# Patient Record
Sex: FEMALE | Race: WHITE | NOT HISPANIC OR LATINO | URBAN - METROPOLITAN AREA
[De-identification: names, ages, dates, MRNs, and addresses within clinical notes are randomized per-mention and may not be internally consistent; named-entity substitution may affect disease eponyms.]

---

## 2019-02-04 ENCOUNTER — TELEPHONE (OUTPATIENT)
Dept: SCHEDULING | Facility: IMAGING CENTER | Age: 59
End: 2019-02-04

## 2019-10-03 ENCOUNTER — OFFICE VISIT (OUTPATIENT)
Dept: NEUROLOGY | Facility: MEDICAL CENTER | Age: 59
End: 2019-10-03
Payer: COMMERCIAL

## 2019-10-03 VITALS
OXYGEN SATURATION: 95 % | HEART RATE: 74 BPM | DIASTOLIC BLOOD PRESSURE: 64 MMHG | WEIGHT: 227 LBS | TEMPERATURE: 98.2 F | SYSTOLIC BLOOD PRESSURE: 102 MMHG | BODY MASS INDEX: 38.76 KG/M2 | HEIGHT: 64 IN

## 2019-10-03 DIAGNOSIS — G43.119 INTRACTABLE MIGRAINE WITH AURA WITHOUT STATUS MIGRAINOSUS: Primary | ICD-10-CM

## 2019-10-03 PROBLEM — M54.17 LUMBOSACRAL RADICULOPATHY AT L5: Status: ACTIVE | Noted: 2019-10-03

## 2019-10-03 PROBLEM — F31.31 BIPOLAR 1 DISORDER, DEPRESSED, MILD (HCC): Status: ACTIVE | Noted: 2019-10-03

## 2019-10-03 PROCEDURE — 99205 OFFICE O/P NEW HI 60 MIN: CPT | Performed by: PSYCHIATRY & NEUROLOGY

## 2019-10-03 RX ORDER — SUMATRIPTAN 6 MG/.5ML
6 INJECTION, SOLUTION SUBCUTANEOUS ONCE
Qty: 0.5 ML | Refills: 0 | Status: SHIPPED | OUTPATIENT
Start: 2019-10-03 | End: 2019-10-03

## 2019-10-03 RX ORDER — FLUOXETINE HYDROCHLORIDE 20 MG/1
20 CAPSULE ORAL DAILY
COMMUNITY

## 2019-10-03 RX ORDER — PROPRANOLOL HYDROCHLORIDE 40 MG/1
20 TABLET ORAL 3 TIMES DAILY
COMMUNITY

## 2019-10-03 RX ORDER — LAMOTRIGINE 150 MG/1
150 TABLET ORAL DAILY
COMMUNITY

## 2019-10-03 RX ORDER — MAGNESIUM 200 MG
TABLET ORAL
COMMUNITY

## 2019-10-03 RX ORDER — PROPRANOLOL HCL 60 MG
CAPSULE, EXTENDED RELEASE 24HR ORAL
Qty: 60 CAP | Refills: 3 | Status: SHIPPED | OUTPATIENT
Start: 2019-10-03

## 2019-10-03 RX ORDER — MULTIVIT-MIN/IRON/FOLIC ACID/K 18-600-40
CAPSULE ORAL
COMMUNITY

## 2019-10-03 RX ORDER — GABAPENTIN 300 MG/1
300 CAPSULE ORAL 3 TIMES DAILY
COMMUNITY

## 2019-10-03 RX ORDER — MONTELUKAST SODIUM 10 MG/1
10 TABLET ORAL DAILY
COMMUNITY

## 2019-10-03 RX ORDER — SUMATRIPTAN 6 MG/.5ML
6 INJECTION, SOLUTION SUBCUTANEOUS ONCE
COMMUNITY
End: 2019-10-03 | Stop reason: SDUPTHER

## 2019-10-03 ASSESSMENT — ENCOUNTER SYMPTOMS
INSOMNIA: 0
LOSS OF CONSCIOUSNESS: 0
CONSTIPATION: 0
MEMORY LOSS: 0
HEADACHES: 1
FALLS: 0

## 2019-10-03 ASSESSMENT — PATIENT HEALTH QUESTIONNAIRE - PHQ9: CLINICAL INTERPRETATION OF PHQ2 SCORE: 0

## 2019-10-04 NOTE — PROGRESS NOTES
"Subjective:      Jacque Reid is a 58 y.o. female who presents from the office of Dr. Huma Card, for consultation, with a history of migraine with aura.    KYMBERLY Santillan is a very pleasant 58-year-old right-handed female who has been suffering from severe headache since about 18 years of age, I am which have overtime changed to a degree though still have a very common phenomenology.    Prodrome: She describes about 24 hours of malaise, yawning and a generalized \"cruddy\" feeling.  There is some right facial tingling that begins.    Aura: She does develop language difficulties during the headache phase itself.    Headache: Typically unilateral over the right, she describes a retro-orbital stabbing and excruciating pain through the eye.  The numbness on the right side of the face worsens, as does the language difficulty, nausea and vomiting, heightened sensitivities, etc. ensue.  She has significant allodynia ipsilaterally as well as autonomic symptoms.    Duration: For quite some time she has had a very mild daily headache.  The severe headaches will last upwards of 3 days, on rescue medication, they now last about 1 day.    Frequency: On her present regimen she has a severe headache once a week.  Off medication she was having at least 2 full headache attacks on a near weekly basis.    Onset: Random over the course of the day, now on CPAP for DANNA, the a.m. onset headaches have stopped.    Triggers: She has a long list with her, the big points are sleep deprivation, stress levels, weather changes, bright lights, food and alcohol.  Prior to her JUVE/BSO at 32 years of age, they were menstrual in association.  Estrogen replacement therapy worsen the headaches.    Work-up: CT and MRI imaging of the brain have always been normal.    Treatment: She is using ibuprofen 800 mg every day for the daily headaches.  Imitrex 6 mg injections consistently improves the pain though the aphasia and other non-pain symptoms remain.  " "Inderal 40 mg daily also has had a significant benefit with the frequency.  On Neurontin 300 mg, 3 times daily for nerve pain, this has provided no benefit.  Depakote, Aleve were ineffective, Topamax caused hallucinations, she avoids TCAs because of her bipolar disease.    She has a medical history of right lower extremity L5 radiculopathy status post decompression in .  She also is bipolar and has IBD.  There is no history of CVA, CAD, PVD, malignancy, thyroid disease, other autoimmune disease, liver or kidney disease, blood dyscrasia, hypertension, diabetes or asthma.  There is no other surgical history of note.    Both her sister and a maternal uncle suffer from migraine.  Her father had heart disease and bipolar disease, her mother had Parkinson's disease, both are .  Though her brother has heart disease and bipolar disease, he does not suffer from headache.    She does not smoke or drink.  She works as an MA.    She is on Inderal 40 mg daily, Imitrex 6 mg injections as needed, Singulair, magnesium 200 mg daily, Lamictal 150 mg daily, Neurontin 300 mg 3 times daily, Prozac 20 mg daily, Advair Diskus and her albuterol inhaler as needed.    Review of Systems   Constitutional: Negative for malaise/fatigue.   Gastrointestinal: Negative for constipation.   Musculoskeletal: Negative for falls.   Neurological: Positive for headaches. Negative for loss of consciousness.   Psychiatric/Behavioral: Negative for memory loss. The patient does not have insomnia.    All other systems reviewed and are negative.       Objective:     /64 (BP Location: Left arm, Patient Position: Sitting, BP Cuff Size: Adult)   Pulse 74   Temp 36.8 °C (98.2 °F) (Temporal)   Ht 1.626 m (5' 4\")   Wt 103 kg (227 lb)   SpO2 95%   BMI 38.96 kg/m²      Physical Exam    She appears in no acute distress.  Vital signs are stable.  There is no malar rash, jaw or temporal tenderness, jaw claudication, or allodynia.  The neck is " supple, range of motion is full, carotid pulses are present bilaterally without asymmetry.  Compression maneuvers are negative.  Lhermitte's phenomena is absent.  Cardiac evaluation reveals a regular rhythm.    Cognition is intact, fully oriented, there is no aphasia, agnosia, apraxia, or inattention.    PERRLA/EOMI, visual fields are full on confrontation to finger counting, facial movements are symmetric, sensory exam is intact to temperature and light touch bilaterally, the tongue and uvula are midline without bulbar dysfunction, shoulder shrug and head rotation are intact and symmetric.    Musculoskeletal exam reveals normal tone throughout, there is no tremor, asterixis, or drift.  Strength is 5/5 bilaterally, reflexes are brisk and present at all points, none are dropped and there are no asymmetries, both toes are downgoing.    She stands easily, arm swing is symmetric, gait is of normal station.  Heel, toe, and tandem walking are all done easily.  There is no appendicular dystaxia with any of the extremities, fine motor control and repetitive movements are also intact and symmetric with normal amplitude and frequencies bilaterally.    Sensory exam is intact to vibration and temperature, Romberg is absent.     Assessment/Plan:     1. Intractable migraine with aura without status migrainosus  She fits criteria for migraine with aura, I talked with her about the 24 hours of a prodrome symptomatology, often mistaken for aura.  Regardless, she is already on medication that should be adjusted upwards, she is tolerating the Inderal at a low dose.  Talked about side effects.  She is using the Imitrex appropriately.  What she must do is stop the regular use of Motrin, she already has CDH, and with a pattern that suggests rebound phenomena occurring.  Off the Motrin, the headaches in general will simply improve naturally.    We have quite a few treatment options left to us.  She is already on magnesium, a list of  supplements was provided, samples of Pentadolex 75 mg, twice daily were provided, butter-luis alfredo having been established as quite effective for migraine prevention.  Other maintenance treatments including Migralief, coenzyme Q10, niacin, and melatonin were mentioned, information provided, she may be interested in trying these.    Face-to-face time spent talking about the nature of her condition, prognosis, other treatment options still available, etc.  Phone contact in the interim, we will follow-up otherwise in about 6 months.    Inderal LA 60 mg will be started for 2 weeks and if needed can be increased to 120 mg daily.  Side effects were reviewed.  Phone contact as we adjust.    - propranolol LA (INDERAL LA) 60 MG CAPSULE SR 24 HR; 1 tab qAM for 1 week, then 2 tab qAM  Dispense: 60 Cap; Refill: 3  - SUMAtriptan Succinate (IMITREX) 6 MG/0.5ML Solution; Inject 0.5 mL as instructed Once for 1 dose.  Dispense: 0.5 mL; Refill: 0    Time: 60-minute spent face-to-face for exam, review, discussion, and education, of this over 50% of the time spent counseling and coordinating care.

## 2019-10-23 ENCOUNTER — TELEPHONE (OUTPATIENT)
Dept: NEUROLOGY | Facility: MEDICAL CENTER | Age: 59
End: 2019-10-23

## 2019-10-23 NOTE — TELEPHONE ENCOUNTER
Dr. Brennan,    Patient called and stated that the Rx's you had put her on is working and she has had relief from Migraines and headaches  (Pentadolex) if I am reading right. She stated that she will call back in a week to tell us again how she is feeling.

## 2019-10-23 NOTE — TELEPHONE ENCOUNTER
If you look at my note, the medications I also used for Inderal LA 60 mg tablets and Imitrex 6 mg injections as needed.  When the patient calls back, find out not just if she is using Pentadolex, but also the others.  Find out what strength of Pentadolex she is using.

## 2019-11-10 ENCOUNTER — OFFICE VISIT (OUTPATIENT)
Dept: URGENT CARE | Facility: PHYSICIAN GROUP | Age: 59
End: 2019-11-10
Payer: COMMERCIAL

## 2019-11-10 VITALS
HEIGHT: 64 IN | SYSTOLIC BLOOD PRESSURE: 108 MMHG | WEIGHT: 210 LBS | HEART RATE: 64 BPM | RESPIRATION RATE: 24 BRPM | OXYGEN SATURATION: 95 % | TEMPERATURE: 97.2 F | BODY MASS INDEX: 35.85 KG/M2 | DIASTOLIC BLOOD PRESSURE: 62 MMHG

## 2019-11-10 DIAGNOSIS — J45.901 ASTHMA WITH ACUTE EXACERBATION, UNSPECIFIED ASTHMA SEVERITY, UNSPECIFIED WHETHER PERSISTENT: ICD-10-CM

## 2019-11-10 PROCEDURE — 99204 OFFICE O/P NEW MOD 45 MIN: CPT | Mod: 25 | Performed by: NURSE PRACTITIONER

## 2019-11-10 PROCEDURE — 94640 AIRWAY INHALATION TREATMENT: CPT | Performed by: NURSE PRACTITIONER

## 2019-11-10 RX ORDER — IPRATROPIUM BROMIDE AND ALBUTEROL SULFATE 2.5; .5 MG/3ML; MG/3ML
3 SOLUTION RESPIRATORY (INHALATION) 4 TIMES DAILY
Qty: 30 BULLET | Refills: 0 | Status: SHIPPED | OUTPATIENT
Start: 2019-11-10

## 2019-11-10 RX ORDER — FLUOXETINE HYDROCHLORIDE 40 MG/1
CAPSULE ORAL
COMMUNITY
Start: 2019-08-27

## 2019-11-10 RX ORDER — FLUOXETINE HYDROCHLORIDE 20 MG/1
CAPSULE ORAL
COMMUNITY
Start: 2018-03-22 | End: 2020-03-21

## 2019-11-10 RX ORDER — INFLUENZA A VIRUS A/BRISBANE/02/2018 (H1N1) RECOMBINANT HEMAGGLUTININ ANTIGEN, INFLUENZA A VIRUS A/KANSAS/14/2017 (H3N2) RECOMBINANT HEMAGGLUTININ ANTIGEN, INFLUENZA B VIRUS B/PHUKET/3073/2013 RECOMBINANT HEMAGGLUTININ ANTIGEN, AND INFLUENZA B VIRUS B/MARYLAND/15/2016 RECOMBINANT HEMAGGLUTININ ANTIGEN 45; 45; 45; 45 UG/.5ML; UG/.5ML; UG/.5ML; UG/.5ML
INJECTION INTRAMUSCULAR
COMMUNITY
Start: 2019-09-27

## 2019-11-10 RX ORDER — FLUOXETINE 10 MG/1
CAPSULE ORAL
COMMUNITY
Start: 2018-03-23 | End: 2020-03-22

## 2019-11-10 RX ORDER — PROPRANOLOL HYDROCHLORIDE 20 MG/1
TABLET ORAL
COMMUNITY
Start: 2018-08-08 | End: 2020-08-07

## 2019-11-10 RX ORDER — IPRATROPIUM BROMIDE AND ALBUTEROL SULFATE 2.5; .5 MG/3ML; MG/3ML
3 SOLUTION RESPIRATORY (INHALATION) ONCE
OUTPATIENT
Start: 2019-11-10 | End: 2019-11-11

## 2019-11-10 RX ORDER — ALBUTEROL SULFATE 90 UG/1
AEROSOL, METERED RESPIRATORY (INHALATION)
COMMUNITY
Start: 2018-03-13 | End: 2020-03-12

## 2019-11-10 RX ORDER — INHALER, ASSIST DEVICES
SPACER (EA) MISCELLANEOUS
COMMUNITY
Start: 2017-11-21 | End: 2019-11-21

## 2019-11-10 RX ORDER — GABAPENTIN 100 MG/1
CAPSULE ORAL
COMMUNITY
Start: 2018-04-25 | End: 2020-04-24

## 2019-11-10 RX ORDER — LAMOTRIGINE 150 MG/1
TABLET ORAL
COMMUNITY
Start: 2018-03-22 | End: 2020-03-21

## 2019-11-10 RX ORDER — FLUOXETINE 10 MG/1
CAPSULE ORAL
COMMUNITY
Start: 2019-11-06

## 2019-11-10 RX ORDER — PREDNISONE 10 MG/1
40 TABLET ORAL DAILY
Qty: 20 TAB | Refills: 0 | Status: SHIPPED | OUTPATIENT
Start: 2019-11-10 | End: 2019-11-15

## 2019-11-10 RX ORDER — SUMATRIPTAN 100 MG/1
TABLET, FILM COATED ORAL
COMMUNITY
Start: 2019-08-27

## 2019-11-10 RX ORDER — ALBUTEROL SULFATE 2.5 MG/3ML
SOLUTION RESPIRATORY (INHALATION)
COMMUNITY
Start: 2018-09-27 | End: 2020-09-26

## 2019-11-10 RX ORDER — MONTELUKAST SODIUM 10 MG/1
TABLET ORAL
COMMUNITY
Start: 2018-03-14 | End: 2020-03-13

## 2019-11-10 RX ORDER — ZOSTER VACCINE RECOMBINANT, ADJUVANTED 50 MCG/0.5
KIT INTRAMUSCULAR
COMMUNITY
Start: 2019-09-27

## 2019-11-10 RX ORDER — SUMATRIPTAN SUCCINATE 6 MG/.5ML
INJECTION, SOLUTION SUBCUTANEOUS
COMMUNITY
Start: 2017-07-31 | End: 2020-03-14

## 2019-11-10 RX ORDER — TRIAMCINOLONE ACETONIDE 1 MG/G
CREAM TOPICAL
COMMUNITY
Start: 2018-04-04 | End: 2020-04-03

## 2019-11-10 ASSESSMENT — ENCOUNTER SYMPTOMS
WHEEZING: 1
HEADACHES: 0
VOMITING: 0
SORE THROAT: 0
NAUSEA: 0
SHORTNESS OF BREATH: 1
FEVER: 0
COUGH: 0
BACK PAIN: 1
STRIDOR: 0

## 2019-11-10 NOTE — NON-PROVIDER
Chief Complaint   Patient presents with   • Shortness of Breath     x2 days. Difficulty breathing.     HPI: Patient presents to clinic complaining of SOB today.  States that she felt a head cold come on yesterday and then felt SOB with wheezing today.  Has tried an albuterol nebulizer at home with minimal results, but admits to medication being .  Is lying on back on exam table, as she states it is easier to breath when she is in this position.  Has history of asthma and currently takes albuterol inhaler and Advair daily for asthma. Denies any OTC medications for cold symptoms.  Associated with nasal congestion, nasal drainage and pain in her upper back. Denies fever, headache, sore throat, and cough.  States that these symptoms frequently happen to her during this time of year and she usually gets a breathing treatment and steroids with good results.      Review of Systems   Constitutional: Negative for fever.   HENT: Positive for congestion. Negative for sore throat.    Respiratory: Positive for shortness of breath and wheezing. Negative for cough and stridor.    Cardiovascular: Negative for chest pain.   Gastrointestinal: Negative for nausea and vomiting.   Musculoskeletal: Positive for back pain (tightness in upper back ).   Neurological: Negative for headaches.     Physical Exam   Constitutional: She is oriented to person, place, and time and well-developed, well-nourished, and in no distress.   HENT:   Head: Normocephalic and atraumatic.   Right Ear: Hearing, tympanic membrane and ear canal normal.   Left Ear: Hearing, tympanic membrane and ear canal normal.   Nose: Rhinorrhea present.   Mouth/Throat: Uvula is midline and mucous membranes are normal. Posterior oropharyngeal erythema present.   Cardiovascular: Normal rate, regular rhythm and normal heart sounds.   Pulmonary/Chest: Breath sounds normal. Tachypnea (Increased work of breathing while talking, able to say 3-4 words with each breath) noted.  She has no wheezes. She has no rhonchi.   Neurological: She is alert and oriented to person, place, and time.     1. Asthma with acute exacerbation, unspecified asthma severity, unspecified whether persistent  Patient given breathing treatment in office. After treatment, patient reports that symptoms have greatly improved.  Lungs clear and pulse ox 96% after treatment.  Requested Rx for duoneb, as she has had this Rx previously and it worked well.  Discussed home care for upper respiratory tract infection.  ER precautions given to patient.   - ipratropium-albuterol (DUONEB) nebulizer solution  - predniSONE (DELTASONE) 10 MG Tab; Take 4 Tabs by mouth every day for 5 days. Take with food.  Dispense: 20 Tab; Refill: 0  - ipratropium-albuterol (DUONEB) 0.5-2.5 (3) MG/3ML nebulizer solution; 3 mL by Nebulization route 4 times a day.  Dispense: 30 Bullet; Refill: 0    ZACH Srinivasan

## 2019-11-11 NOTE — PROGRESS NOTES
Chief Complaint   Patient presents with   • Shortness of Breath     x2 days. Difficulty breathing.       HISTORY OF PRESENT ILLNESS: Patient is a 59 y.o. female who presents to urgent care today with complaints of asthma exacerbation.  The patient notes the past 24 hours she has had a cough, wheezing, and shortness of breath.  Notes her symptoms started yesterday with URI symptoms and have progressed throughout today.  She admits to a history of asthma and states her symptoms today are exactly similar to previous asthma exacerbations.  Also admits to associated congestion and chest tightness, this is common for her with her asthma exacerbations.  She has taken her home albuterol nebulizer without much improvement.  She also takes albuterol inhaler as well as Advair.  Typically, a DuoNeb along with steroids is beneficial in treatment.    Patient Active Problem List    Diagnosis Date Noted   • Bipolar 1 disorder, depressed, mild (HCC) 10/03/2019   • Lumbosacral radiculopathy at L5 10/03/2019   • Intractable migraine with aura without status migrainosus 10/03/2019       Allergies:Amoxicillin; Erythromycin; Hydrocodone; Topiramate; and Vicodin [hydrocodone-acetaminophen]    Current Outpatient Medications Ordered in Epic   Medication Sig Dispense Refill   • albuterol (VENTOLIN HFA) 108 (90 Base) MCG/ACT Aero Soln inhalation aerosol Inhale 1 to 2 puffs orally every 4 to 6 hours as needed for quick relief of asthma symptoms. 100 days' supply for asthma is 1 canister. Shake well before use, keep at room temperature. Inhale slowly and deeply through inhalational spacer (no whistling sound) Clean/rinse mouthpiece weekly     • Peak Flow Meter (TRUZONE PEAK FLOW METER) Device by Does not apply route.     • Spacer/Aero-Holding Chambers (AEROCHAMBER PLUS-FLOW SIGNAL) Misc by Does not apply route.     • sumatriptan (IMITREX) 100 MG tablet      • predniSONE (DELTASONE) 10 MG Tab Take 4 Tabs by mouth every day for 5 days. Take with  food. 20 Tab 0   • ipratropium-albuterol (DUONEB) 0.5-2.5 (3) MG/3ML nebulizer solution 3 mL by Nebulization route 4 times a day. 30 Bullet 0   • lamotrigine (LAMICTAL) 150 MG tablet Take 150 mg by mouth every day.     • FLUoxetine (PROZAC) 20 MG Cap Take 20 mg by mouth every day. Indications: takes three times a day     • propranolol (INDERAL) 40 MG Tab Take 20 mg by mouth 3 times a day.     • montelukast (SINGULAIR) 10 MG Tab Take 10 mg by mouth every day.     • gabapentin (NEURONTIN) 300 MG Cap Take 300 mg by mouth 3 times a day.     • Magnesium 200 MG Tab Take  by mouth.     • fluticasone-salmeterol (ADVAIR DISKUS) 250-50 MCG/DOSE AEROSOL POWDER, BREATH ACTIVATED Inhale 1 Puff by mouth every 12 hours.     • albuterol (PROVENTIL) 2.5mg/3ml Nebu Soln solution for nebulization Inhale  by mouth.     • aspirin EC (ECOTRIN) 81 MG Tablet Delayed Response Take  by mouth.     • fluoxetine (PROZAC) 40 MG capsule      • FLUBLOK QUADRIVALENT 0.5 ML Solution Prefilled Syringe injection      • SUMAtriptan Succinate Refill 6 MG/0.5ML Solution Cartridge Use as directed for migraine. Special Order Item: Allow 5 Working Days.     • SHINGRIX 50 MCG/0.5ML Recon Susp      • triamcinolone acetonide (KENALOG) 0.1 % Cream Apply  to affected area(s).     • FLUoxetine (PROZAC) 10 MG Cap TAKE 1 CAPSULE ORALLY EVERY MORNING WITH 20 MG CAPSULE     • FLUoxetine (PROZAC) 10 MG Cap      • FLUoxetine (PROZAC) 20 MG Cap Take 1 capsule by mouth daily . Note: New Strength     • fluticasone-salmeterol (ADVAIR DISKUS) 250-50 MCG/DOSE AEROSOL POWDER, BREATH ACTIVATED Inhale  by mouth.     • gabapentin (NEURONTIN) 100 MG Cap Take  by mouth.     • lamotrigine (LAMICTAL) 150 MG tablet Take 1 tablet by mouth daily     • montelukast (SINGULAIR) 10 MG Tab Take  by mouth.     • propranolol (INDERAL) 20 MG Tab Take 1 tablet by mouth 2 times a day     • Cholecalciferol (VITAMIN D) 2000 units Cap Take  by mouth.     • ALBUTEROL INH Inhale  by mouth.     •  "propranolol LA (INDERAL LA) 60 MG CAPSULE SR 24 HR 1 tab qAM for 1 week, then 2 tab qAM (Patient not taking: Reported on 11/10/2019) 60 Cap 3     Current Facility-Administered Medications Ordered in Epic   Medication Dose Route Frequency Provider Last Rate Last Dose   • ipratropium-albuterol (DUONEB) nebulizer solution  3 mL Nebulization Once THONG IbarraP.R.NJaclyn           History reviewed. No pertinent past medical history.    Social History     Tobacco Use   • Smoking status: Never Smoker   • Smokeless tobacco: Never Used   Substance Use Topics   • Alcohol use: Not Currently   • Drug use: Never       No family status information on file.   History reviewed. No pertinent family history.    ROS:  Review of Systems   Constitutional: Negative for fever, chills, weight loss, malaise, and fatigue.   HENT: Positive for congestion.  Negative for ear pain, nosebleeds, sore throat and neck pain.    Eyes: Negative for vision changes.   Neuro: Negative for headache, sensory changes, weakness, seizure, LOC.   Cardiovascular: Negative for chest pain, palpitations, orthopnea and leg swelling.   Respiratory: Positive for cough, chest and back tightness, shortness of breath and wheezing.   Gastrointestinal: Negative for abdominal pain, nausea, vomiting or diarrhea.   Genitourinary: Negative for dysuria, urgency and frequency.  Musculoskeletal: Negative for falls, neck pain, back pain, joint pain, myalgias.   Skin: Negative for rash, diaphoresis.     Exam:  /62   Pulse 64   Temp 36.2 °C (97.2 °F)   Resp (!) 24   Ht 1.626 m (5' 4\")   Wt 95.3 kg (210 lb)   SpO2 95%   General: well-nourished, well-developed female in NAD  Head: normocephalic, atraumatic  Eyes: PERRLA, no conjunctival injection, acuity grossly intact, lids normal.  Ears: normal shape and symmetry, no tenderness, no discharge. External canals are without any significant edema or erythema. Tympanic membranes are without any inflammation, no effusion. Gross " auditory acuity is intact.  Nose: symmetrical without tenderness, no discharge.  Mouth/Throat: reasonable hygiene, no erythema, exudates or tonsillar enlargement.  Neck: no masses, range of motion within normal limits, no tracheal deviation. No obvious thyroid enlargement.   Lymph: no cervical adenopathy. No supraclavicular adenopathy.   Neuro: alert and oriented. Cranial nerves 1-12 grossly intact. No sensory deficit.   Cardiovascular: regular rate and rhythm. No edema.  Pulmonary: Speaking in slightly shorter sentences but is in no acute distress. Chest is symmetrical with respiration, no wheezes, crackles, or rhonchi.  Diminished throughout.  Musculoskeletal: no clubbing, appropriate muscle tone, gait is stable.  Skin: warm, dry, intact, no clubbing, no cyanosis, no rashes.   Psych: appropriate mood, affect, judgement.         Assessment/Plan:  1. Asthma with acute exacerbation, unspecified asthma severity, unspecified whether persistent  ipratropium-albuterol (DUONEB) nebulizer solution    predniSONE (DELTASONE) 10 MG Tab    ipratropium-albuterol (DUONEB) 0.5-2.5 (3) MG/3ML nebulizer solution       Patient is given a DuoNeb treatment in clinic with improvement of her symptoms.  She will be given DuoNeb nebulizer solution at home, use as directed.  She also be given a course of prednisone.  Continue home asthma medications as directed.  Supportive care, differential diagnoses, and indications for immediate follow-up discussed with patient.   Pathogenesis of diagnosis discussed including typical length and natural progression.   Instructed to return to clinic or nearest emergency department for any change in condition, further concerns, or worsening of symptoms.  Patient states understanding of the plan of care and discharge instructions.  Instructed to make an appointment, for follow up, with her primary care provider.  Patient was evaluated both by myself and APRN student today.      Please note that this  dictation was created using voice recognition software. I have made every reasonable attempt to correct obvious errors, but I expect that there are errors of grammar and possibly content that I did not discover before finalizing the note.      SLAVA Ibarra.

## 2021-03-15 DIAGNOSIS — Z23 NEED FOR VACCINATION: ICD-10-CM

## 2021-03-24 ENCOUNTER — IMMUNIZATION (OUTPATIENT)
Dept: FAMILY PLANNING/WOMEN'S HEALTH CLINIC | Facility: IMMUNIZATION CENTER | Age: 61
End: 2021-03-24
Attending: INTERNAL MEDICINE
Payer: COMMERCIAL

## 2021-03-24 DIAGNOSIS — Z23 NEED FOR VACCINATION: ICD-10-CM

## 2021-03-24 DIAGNOSIS — Z23 ENCOUNTER FOR VACCINATION: Primary | ICD-10-CM

## 2021-03-24 PROCEDURE — 0001A PFIZER SARS-COV-2 VACCINE: CPT

## 2021-03-24 PROCEDURE — 91300 PFIZER SARS-COV-2 VACCINE: CPT

## 2021-04-16 ENCOUNTER — IMMUNIZATION (OUTPATIENT)
Dept: FAMILY PLANNING/WOMEN'S HEALTH CLINIC | Facility: IMMUNIZATION CENTER | Age: 61
End: 2021-04-16
Attending: INTERNAL MEDICINE
Payer: COMMERCIAL

## 2021-04-16 DIAGNOSIS — Z23 ENCOUNTER FOR VACCINATION: Primary | ICD-10-CM

## 2021-04-16 PROCEDURE — 0002A PFIZER SARS-COV-2 VACCINE: CPT

## 2021-04-16 PROCEDURE — 91300 PFIZER SARS-COV-2 VACCINE: CPT

## 2023-08-22 ENCOUNTER — APPOINTMENT (OUTPATIENT)
Dept: URGENT CARE | Facility: PHYSICIAN GROUP | Age: 63
End: 2023-08-22
Payer: COMMERCIAL

## 2023-12-05 ENCOUNTER — APPOINTMENT (RX ONLY)
Dept: URBAN - METROPOLITAN AREA CLINIC 4 | Facility: CLINIC | Age: 63
Setting detail: DERMATOLOGY
End: 2023-12-05

## 2023-12-05 DIAGNOSIS — D485 NEOPLASM OF UNCERTAIN BEHAVIOR OF SKIN: ICD-10-CM | Status: INADEQUATELY CONTROLLED

## 2023-12-05 PROBLEM — D48.5 NEOPLASM OF UNCERTAIN BEHAVIOR OF SKIN: Status: ACTIVE | Noted: 2023-12-05

## 2023-12-05 PROCEDURE — ? BIOPSY BY SHAVE METHOD

## 2023-12-05 PROCEDURE — 11102 TANGNTL BX SKIN SINGLE LES: CPT

## 2023-12-05 ASSESSMENT — LOCATION DETAILED DESCRIPTION DERM: LOCATION DETAILED: LEFT SUPERIOR CENTRAL MALAR CHEEK

## 2023-12-05 ASSESSMENT — LOCATION ZONE DERM: LOCATION ZONE: FACE

## 2023-12-05 ASSESSMENT — LOCATION SIMPLE DESCRIPTION DERM: LOCATION SIMPLE: LEFT CHEEK

## 2025-04-07 ENCOUNTER — HOSPITAL ENCOUNTER (OUTPATIENT)
Dept: RADIOLOGY | Facility: MEDICAL CENTER | Age: 65
End: 2025-04-07
Attending: FAMILY MEDICINE
Payer: COMMERCIAL

## 2025-04-07 DIAGNOSIS — M54.50 LUMBAR BACK PAIN: ICD-10-CM

## 2025-04-07 PROCEDURE — 72148 MRI LUMBAR SPINE W/O DYE: CPT

## 2025-05-16 PROBLEM — M48.062 NEUROGENIC CLAUDICATION DUE TO LUMBAR SPINAL STENOSIS: Status: ACTIVE | Noted: 2025-05-16

## 2025-07-16 ENCOUNTER — HOSPITAL ENCOUNTER (OUTPATIENT)
Dept: RADIOLOGY | Facility: MEDICAL CENTER | Age: 65
End: 2025-07-16
Attending: INTERNAL MEDICINE
Payer: COMMERCIAL

## 2025-07-16 ENCOUNTER — HOSPITAL ENCOUNTER (OUTPATIENT)
Dept: RADIOLOGY | Facility: MEDICAL CENTER | Age: 65
End: 2025-07-16
Attending: NEUROLOGICAL SURGERY
Payer: COMMERCIAL

## 2025-07-16 DIAGNOSIS — M79.642 LEFT HAND PAIN: ICD-10-CM

## 2025-07-16 DIAGNOSIS — M48.062 SPINAL STENOSIS OF LUMBAR REGION WITH NEUROGENIC CLAUDICATION: ICD-10-CM

## 2025-07-16 DIAGNOSIS — M79.672 LEFT FOOT PAIN: ICD-10-CM

## 2025-07-16 DIAGNOSIS — M79.671 RIGHT FOOT PAIN: ICD-10-CM

## 2025-07-16 DIAGNOSIS — M25.572 ARTHRALGIA OF LEFT ANKLE OR FOOT: ICD-10-CM

## 2025-07-16 DIAGNOSIS — M79.641 RIGHT HAND PAIN: ICD-10-CM

## 2025-07-16 DIAGNOSIS — M25.571 ARTHRALGIA OF RIGHT ANKLE: ICD-10-CM

## 2025-07-16 PROCEDURE — 73610 X-RAY EXAM OF ANKLE: CPT | Mod: LT

## 2025-07-16 PROCEDURE — 73120 X-RAY EXAM OF HAND: CPT | Mod: RT

## 2025-07-16 PROCEDURE — 73630 X-RAY EXAM OF FOOT: CPT | Mod: LT

## 2025-07-16 PROCEDURE — 73120 X-RAY EXAM OF HAND: CPT | Mod: LT

## 2025-07-16 PROCEDURE — 73610 X-RAY EXAM OF ANKLE: CPT | Mod: RT

## 2025-07-16 PROCEDURE — 73630 X-RAY EXAM OF FOOT: CPT | Mod: RT

## 2025-07-16 PROCEDURE — 71046 X-RAY EXAM CHEST 2 VIEWS: CPT
